# Patient Record
Sex: FEMALE | Race: WHITE | NOT HISPANIC OR LATINO | Employment: OTHER | URBAN - METROPOLITAN AREA
[De-identification: names, ages, dates, MRNs, and addresses within clinical notes are randomized per-mention and may not be internally consistent; named-entity substitution may affect disease eponyms.]

---

## 2018-04-25 ENCOUNTER — ESTABLISHED COMPREHENSIVE EXAM (OUTPATIENT)
Dept: URBAN - METROPOLITAN AREA CLINIC 43 | Facility: CLINIC | Age: 68
End: 2018-04-25

## 2018-04-25 DIAGNOSIS — H25.813: ICD-10-CM

## 2018-04-25 DIAGNOSIS — H04.123: ICD-10-CM

## 2018-04-25 PROCEDURE — G8427 DOCREV CUR MEDS BY ELIG CLIN: HCPCS

## 2018-04-25 PROCEDURE — 92015 DETERMINE REFRACTIVE STATE: CPT

## 2018-04-25 PROCEDURE — 92014 COMPRE OPH EXAM EST PT 1/>: CPT

## 2018-04-25 PROCEDURE — G8756 NO BP MEASURE DOC: HCPCS

## 2018-04-25 PROCEDURE — 1036F TOBACCO NON-USER: CPT

## 2018-04-25 ASSESSMENT — VISUAL ACUITY
OS_SC: J1-
OS_CC: J12
OD_SC: J1
OS_SC: 20/400
OD_SC: 20/100
OS_CC: 20/30+2

## 2018-04-25 ASSESSMENT — TONOMETRY
OS_IOP_MMHG: 19
OD_IOP_MMHG: 20

## 2020-06-19 NOTE — PATIENT DISCUSSION
PLAN: CE/IOL OD THEN OS, goal lulu ou, p.o with Dr. Kwaku Rey, no Mirella robbin needed. Candidate for all lens options but pt wants BASIC LULU OU. Understands she will need glasses for all ranges. Discussed limitations secondary to HELENA results. No Imprimis ou.

## 2020-06-19 NOTE — PATIENT DISCUSSION
PLAN: CE/IOL OD THEN OS, goal lulu ou, p.o with Dr. wKaku Rey, no Anel Rend needed. Candidate for all lens options but pt wants BASIC LULU OU. Understands she will need glasses for all ranges. Discussed limitations secondary to HELENA results. No Imprimis ou.

## 2020-07-13 NOTE — PATIENT DISCUSSION
PLAN: CE/IOL OD THEN OS, goal lulu ou, p.o with Dr. Theodora Gomes, no Belinda Reges needed. Candidate for all lens options but pt wants BASIC LULU OU. Understands she will need glasses for all ranges. Discussed limitations secondary to HELENA results. No Imprimis ou.

## 2020-07-13 NOTE — PATIENT DISCUSSION
PLAN: CE/IOL OD THEN OS, goal lulu ou, p.o with Dr. Joan Valencia, no Evon Later needed. Candidate for all lens options but pt wants BASIC LULU OU. Understands she will need glasses for all ranges. Discussed limitations secondary to HELENA results. No Imprimis ou.

## 2020-07-13 NOTE — PATIENT DISCUSSION
PLAN: CE/IOL OD THEN OS, goal lulu ou, p.o with Dr. Evelia Cintron, no Shania Nicks needed. Candidate for all lens options but pt wants BASIC LULU OU. Understands she will need glasses for all ranges. Discussed limitations secondary to HELENA results. No Imprimis ou.

## 2020-07-14 NOTE — PATIENT DISCUSSION
PLAN: CE/IOL OD THEN OS, goal lulu ou, p.o with Dr. Johnathan Goldberg, no Clorinda Hook needed. Candidate for all lens options but pt wants BASIC LULU OU. Understands she will need glasses for all ranges. Discussed limitations secondary to HELENA results. No Imprimis ou.

## 2020-07-20 NOTE — PATIENT DISCUSSION
PLAN: CE/IOL OD THEN OS, goal lulu ou, p.o with Dr. Tere Scales, no Mirella robbin needed. Candidate for all lens options but pt wants BASIC LULU OU. Understands she will need glasses for all ranges. Discussed limitations secondary to HELENA results. No Imprimis ou.

## 2020-07-21 NOTE — PATIENT DISCUSSION
Patient is healing well. Vision should continue to improve. Continue following the drop calendar. PO K EDEMA.

## 2020-07-21 NOTE — PATIENT DISCUSSION
PLAN: CE/IOL OD THEN OS, goal lulu ou, p.o with Dr. Mary Lou Smith, no [facility] needed. Candidate for all lens options but pt wants BASIC LULU OU. Understands she will need glasses for all ranges. Discussed limitations secondary to HELENA results. No Imprimis ou.

## 2022-07-31 NOTE — PATIENT DISCUSSION
1 day PO: Patient is doing well post-operatively. The importance of post-op drop compliance was emphasized. Drop schedule reviewed with patient. Patient to call if any visual changes or concerns. 31-Jul-2022 02:06